# Patient Record
Sex: FEMALE | Race: WHITE | Employment: OTHER | URBAN - METROPOLITAN AREA
[De-identification: names, ages, dates, MRNs, and addresses within clinical notes are randomized per-mention and may not be internally consistent; named-entity substitution may affect disease eponyms.]

---

## 2017-04-19 ENCOUNTER — HOSPITAL ENCOUNTER (OUTPATIENT)
Dept: CT IMAGING | Age: 62
Discharge: HOME | End: 2017-04-19
Attending: UROLOGY
Payer: COMMERCIAL

## 2017-04-19 DIAGNOSIS — R91.1 LUNG NODULE: ICD-10-CM

## 2017-04-19 DIAGNOSIS — D41.02: ICD-10-CM

## 2017-04-19 PROCEDURE — 74011636320 HC RX REV CODE- 636/320: Performed by: UROLOGY

## 2017-04-19 PROCEDURE — 74160 CT ABDOMEN W/CONTRAST: CPT

## 2017-04-19 RX ADMIN — IOPAMIDOL 100 ML: 612 INJECTION, SOLUTION INTRAVENOUS at 10:04

## 2018-05-21 ENCOUNTER — HOSPITAL ENCOUNTER (OUTPATIENT)
Dept: CT IMAGING | Age: 63
Discharge: HOME | End: 2018-05-21
Attending: UROLOGY
Payer: COMMERCIAL

## 2018-05-21 DIAGNOSIS — D41.02 NEOPLASM OF UNCERTAIN BEHAVIOR OF LEFT KIDNEY: ICD-10-CM

## 2018-05-21 DIAGNOSIS — R91.1 SOLITARY LUNG NODULE: ICD-10-CM

## 2018-05-21 PROCEDURE — 74011636320 HC RX REV CODE- 636/320: Performed by: UROLOGY

## 2018-05-21 PROCEDURE — 74160 CT ABDOMEN W/CONTRAST: CPT

## 2018-05-21 RX ADMIN — IOPAMIDOL 100 ML: 612 INJECTION, SOLUTION INTRAVENOUS at 09:55

## 2019-08-16 ENCOUNTER — HOSPITAL ENCOUNTER (OUTPATIENT)
Dept: ULTRASOUND IMAGING | Age: 64
Discharge: HOME | End: 2019-08-16
Attending: UROLOGY
Payer: COMMERCIAL

## 2019-08-16 DIAGNOSIS — D41.02 NEOPLASM OF UNCERTAIN BEHAVIOR OF LEFT KIDNEY: ICD-10-CM

## 2019-08-16 PROCEDURE — 76770 US EXAM ABDO BACK WALL COMP: CPT

## 2020-08-03 ENCOUNTER — HOSPITAL ENCOUNTER (OUTPATIENT)
Dept: ULTRASOUND IMAGING | Age: 65
Discharge: HOME | End: 2020-08-03
Attending: UROLOGY
Payer: COMMERCIAL

## 2020-08-03 DIAGNOSIS — D41.02 NEOPLASM OF UNCERTAIN BEHAVIOR OF LEFT KIDNEY: ICD-10-CM

## 2020-08-03 PROCEDURE — 76770 US EXAM ABDO BACK WALL COMP: CPT

## 2021-07-20 ENCOUNTER — TRANSCRIBE ORDER (OUTPATIENT)
Dept: SCHEDULING | Age: 66
End: 2021-07-20

## 2021-07-20 DIAGNOSIS — N20.1 URETERAL STONE: Primary | ICD-10-CM

## 2021-08-11 ENCOUNTER — HOSPITAL ENCOUNTER (OUTPATIENT)
Dept: ULTRASOUND IMAGING | Age: 66
Discharge: HOME | End: 2021-08-11
Attending: UROLOGY
Payer: MEDICARE

## 2021-08-11 DIAGNOSIS — N20.1 URETERAL STONE: ICD-10-CM

## 2021-08-11 PROCEDURE — 76770 US EXAM ABDO BACK WALL COMP: CPT

## 2022-06-27 ENCOUNTER — HOSPITAL ENCOUNTER (INPATIENT)
Age: 67
LOS: 2 days | Discharge: HOME | DRG: 811 | End: 2022-06-29
Attending: EMERGENCY MEDICINE | Admitting: FAMILY MEDICINE
Payer: MEDICARE

## 2022-06-27 DIAGNOSIS — K92.1 MELENA: Primary | ICD-10-CM

## 2022-06-27 DIAGNOSIS — D64.9 ANEMIA, UNSPECIFIED TYPE: ICD-10-CM

## 2022-06-27 LAB
ALBUMIN SERPL-MCNC: 3.7 G/DL (ref 3.4–5)
ALBUMIN/GLOB SERPL: 1 {RATIO} (ref 0.8–1.7)
ALP SERPL-CCNC: 122 U/L (ref 45–117)
ALT SERPL-CCNC: 26 U/L (ref 13–56)
ANION GAP SERPL CALC-SCNC: 2 MMOL/L (ref 3–18)
APTT PPP: 27.2 SEC (ref 23–36.4)
AST SERPL-CCNC: 20 U/L (ref 10–38)
BASOPHILS # BLD: 0.1 K/UL (ref 0–0.1)
BASOPHILS NFR BLD: 1 % (ref 0–2)
BILIRUB SERPL-MCNC: 0.2 MG/DL (ref 0.2–1)
BUN SERPL-MCNC: 18 MG/DL (ref 7–18)
BUN/CREAT SERPL: 16 (ref 12–20)
CALCIUM SERPL-MCNC: 8.7 MG/DL (ref 8.5–10.1)
CHLORIDE SERPL-SCNC: 106 MMOL/L (ref 100–111)
CO2 SERPL-SCNC: 30 MMOL/L (ref 21–32)
COVID-19 RAPID TEST, COVR: NOT DETECTED
CREAT SERPL-MCNC: 1.14 MG/DL (ref 0.6–1.3)
DIFFERENTIAL METHOD BLD: ABNORMAL
EOSINOPHIL # BLD: 0.1 K/UL (ref 0–0.4)
EOSINOPHIL NFR BLD: 1 % (ref 0–5)
ERYTHROCYTE [DISTWIDTH] IN BLOOD BY AUTOMATED COUNT: 18.1 % (ref 11.6–14.5)
GLOBULIN SER CALC-MCNC: 3.6 G/DL (ref 2–4)
GLUCOSE SERPL-MCNC: 104 MG/DL (ref 74–99)
HCT VFR BLD AUTO: 20.3 % (ref 35–45)
HEMOCCULT STL QL: POSITIVE
HGB BLD-MCNC: 5.5 G/DL (ref 12–16)
HISTORY CHECKED?,CKHIST: NORMAL
IMM GRANULOCYTES # BLD AUTO: 0.1 K/UL (ref 0–0.04)
IMM GRANULOCYTES NFR BLD AUTO: 1 % (ref 0–0.5)
INR PPP: 0.9 (ref 0.8–1.2)
LYMPHOCYTES # BLD: 3 K/UL (ref 0.9–3.6)
LYMPHOCYTES NFR BLD: 30 % (ref 21–52)
MCH RBC QN AUTO: 18.8 PG (ref 24–34)
MCHC RBC AUTO-ENTMCNC: 27.1 G/DL (ref 31–37)
MCV RBC AUTO: 69.3 FL (ref 78–100)
MONOCYTES # BLD: 0.5 K/UL (ref 0.05–1.2)
MONOCYTES NFR BLD: 5 % (ref 3–10)
NEUTS SEG # BLD: 6.2 K/UL (ref 1.8–8)
NEUTS SEG NFR BLD: 62 % (ref 40–73)
NRBC # BLD: 0 K/UL (ref 0–0.01)
NRBC BLD-RTO: 0 PER 100 WBC
PLATELET # BLD AUTO: 422 K/UL (ref 135–420)
PLATELET COMMENTS,PCOM: ABNORMAL
PMV BLD AUTO: 8.6 FL (ref 9.2–11.8)
POTASSIUM SERPL-SCNC: 3.9 MMOL/L (ref 3.5–5.5)
PROT SERPL-MCNC: 7.3 G/DL (ref 6.4–8.2)
PROTHROMBIN TIME: 13 SEC (ref 11.5–15.2)
RBC # BLD AUTO: 2.93 M/UL (ref 4.2–5.3)
RBC MORPH BLD: ABNORMAL
SODIUM SERPL-SCNC: 138 MMOL/L (ref 136–145)
SOURCE, COVRS: NORMAL
TROPONIN-HIGH SENSITIVITY: 9 NG/L (ref 0–54)
WBC # BLD AUTO: 10 K/UL (ref 4.6–13.2)

## 2022-06-27 PROCEDURE — 85025 COMPLETE CBC W/AUTO DIFF WBC: CPT

## 2022-06-27 PROCEDURE — 82272 OCCULT BLD FECES 1-3 TESTS: CPT

## 2022-06-27 PROCEDURE — 85730 THROMBOPLASTIN TIME PARTIAL: CPT

## 2022-06-27 PROCEDURE — 85610 PROTHROMBIN TIME: CPT

## 2022-06-27 PROCEDURE — 84484 ASSAY OF TROPONIN QUANT: CPT

## 2022-06-27 PROCEDURE — C9113 INJ PANTOPRAZOLE SODIUM, VIA: HCPCS | Performed by: EMERGENCY MEDICINE

## 2022-06-27 PROCEDURE — 86900 BLOOD TYPING SEROLOGIC ABO: CPT

## 2022-06-27 PROCEDURE — P9016 RBC LEUKOCYTES REDUCED: HCPCS

## 2022-06-27 PROCEDURE — 96374 THER/PROPH/DIAG INJ IV PUSH: CPT

## 2022-06-27 PROCEDURE — 80053 COMPREHEN METABOLIC PANEL: CPT

## 2022-06-27 PROCEDURE — 65270000029 HC RM PRIVATE

## 2022-06-27 PROCEDURE — 74011250636 HC RX REV CODE- 250/636: Performed by: EMERGENCY MEDICINE

## 2022-06-27 PROCEDURE — 74011000250 HC RX REV CODE- 250: Performed by: FAMILY MEDICINE

## 2022-06-27 PROCEDURE — 93005 ELECTROCARDIOGRAM TRACING: CPT

## 2022-06-27 PROCEDURE — 30233N1 TRANSFUSION OF NONAUTOLOGOUS RED BLOOD CELLS INTO PERIPHERAL VEIN, PERCUTANEOUS APPROACH: ICD-10-PCS | Performed by: FAMILY MEDICINE

## 2022-06-27 PROCEDURE — 87635 SARS-COV-2 COVID-19 AMP PRB: CPT

## 2022-06-27 PROCEDURE — 86920 COMPATIBILITY TEST SPIN: CPT

## 2022-06-27 PROCEDURE — 36430 TRANSFUSION BLD/BLD COMPNT: CPT

## 2022-06-27 PROCEDURE — 99285 EMERGENCY DEPT VISIT HI MDM: CPT

## 2022-06-27 RX ORDER — SODIUM CHLORIDE 0.9 % (FLUSH) 0.9 %
5-40 SYRINGE (ML) INJECTION EVERY 8 HOURS
Status: DISCONTINUED | OUTPATIENT
Start: 2022-06-27 | End: 2022-06-29 | Stop reason: HOSPADM

## 2022-06-27 RX ORDER — ACETAMINOPHEN 650 MG/1
650 SUPPOSITORY RECTAL
Status: DISCONTINUED | OUTPATIENT
Start: 2022-06-27 | End: 2022-06-29 | Stop reason: HOSPADM

## 2022-06-27 RX ORDER — SODIUM CHLORIDE 9 MG/ML
250 INJECTION, SOLUTION INTRAVENOUS AS NEEDED
Status: DISCONTINUED | OUTPATIENT
Start: 2022-06-27 | End: 2022-06-29 | Stop reason: HOSPADM

## 2022-06-27 RX ORDER — POLYETHYLENE GLYCOL 3350 17 G/17G
17 POWDER, FOR SOLUTION ORAL DAILY PRN
Status: DISCONTINUED | OUTPATIENT
Start: 2022-06-27 | End: 2022-06-29 | Stop reason: HOSPADM

## 2022-06-27 RX ORDER — PANTOPRAZOLE SODIUM 40 MG/10ML
40 INJECTION, POWDER, LYOPHILIZED, FOR SOLUTION INTRAVENOUS EVERY 12 HOURS
Status: DISCONTINUED | OUTPATIENT
Start: 2022-06-27 | End: 2022-06-29 | Stop reason: HOSPADM

## 2022-06-27 RX ORDER — PANTOPRAZOLE SODIUM 40 MG/10ML
40 INJECTION, POWDER, LYOPHILIZED, FOR SOLUTION INTRAVENOUS
Status: COMPLETED | OUTPATIENT
Start: 2022-06-27 | End: 2022-06-27

## 2022-06-27 RX ORDER — ONDANSETRON 4 MG/1
4 TABLET, ORALLY DISINTEGRATING ORAL
Status: DISCONTINUED | OUTPATIENT
Start: 2022-06-27 | End: 2022-06-29 | Stop reason: HOSPADM

## 2022-06-27 RX ORDER — SODIUM CHLORIDE 0.9 % (FLUSH) 0.9 %
5-40 SYRINGE (ML) INJECTION AS NEEDED
Status: DISCONTINUED | OUTPATIENT
Start: 2022-06-27 | End: 2022-06-29 | Stop reason: HOSPADM

## 2022-06-27 RX ORDER — ACETAMINOPHEN 325 MG/1
650 TABLET ORAL
Status: DISCONTINUED | OUTPATIENT
Start: 2022-06-27 | End: 2022-06-29 | Stop reason: HOSPADM

## 2022-06-27 RX ORDER — ONDANSETRON 2 MG/ML
4 INJECTION INTRAMUSCULAR; INTRAVENOUS
Status: DISCONTINUED | OUTPATIENT
Start: 2022-06-27 | End: 2022-06-29 | Stop reason: HOSPADM

## 2022-06-27 RX ORDER — SERTRALINE HYDROCHLORIDE 100 MG/1
100 TABLET, FILM COATED ORAL DAILY
COMMUNITY

## 2022-06-27 RX ADMIN — SODIUM CHLORIDE, PRESERVATIVE FREE 10 ML: 5 INJECTION INTRAVENOUS at 22:45

## 2022-06-27 RX ADMIN — PANTOPRAZOLE SODIUM 40 MG: 40 INJECTION, POWDER, FOR SOLUTION INTRAVENOUS at 19:34

## 2022-06-27 NOTE — CONSENT
Informed Consent for Blood Component Transfusion Note    I have discussed with the patient and spouse the rationale for blood component transfusion; its benefits in treating or preventing fatigue, organ damage, or death; and its risk which includes mild transfusion reactions, rare risk of blood borne infection, or more serious but rare reactions. I have discussed the alternatives to transfusion, including the risk and consequences of not receiving transfusion. The patient and spouse had an opportunity to ask questions and had agreed to proceed with transfusion of blood components.     Electronically signed by Kylee Sutherland MD on 6/27/22 at 7:24 PM

## 2022-06-27 NOTE — ED TRIAGE NOTES
Pt arrives to ed reporting getting a call from her dr instructing her to come into the ED for abnormal labs. Labs hemoglobin 5.4 hematocrit 17. 5. pt reports dizziness for the past 6 weeks and also noticed darker stools today.

## 2022-06-27 NOTE — ED PROVIDER NOTES
EMERGENCY DEPARTMENT HISTORY AND PHYSICAL EXAM    Date: 6/27/2022  Patient Name: Teo Friedman    History of Presenting Illness     Chief Complaint   Patient presents with    Abnormal Lab Results         History Provided By: Patient and Patient's     7:02 PM  Teo Friedman is a 69 y.o. female with no significant PMHX who presents to the emergency department C/O dizziness and shortness of breath. Patient reports that she has no symptoms over the past few weeks. She reports is worse with exertion. She states she went to her primary care doctor today and had blood work and was called and told her hemoglobin was critically low and she is to come to the emergency department for transfusion. She states that she has noted that her stool has been black in color for the last few days. Denies any bright red blood in her stool, vaginal bleeding, other blood loss, abdominal pain, fever, cough, chest pain, other complaints. She reports she has had a colonoscopy but not recently and there were no abnormalities at that time. No other relieving or exacerbating factors identified.      PCP: Gabriela Koenig MD    Current Facility-Administered Medications   Medication Dose Route Frequency Provider Last Rate Last Admin    0.9% sodium chloride infusion 250 mL  250 mL IntraVENous PRN Mil Reno MD        sodium chloride (NS) flush 5-40 mL  5-40 mL IntraVENous Q8H Elyssa Pascual MD        sodium chloride (NS) flush 5-40 mL  5-40 mL IntraVENous PRN Elyssa Pascual MD        acetaminophen (TYLENOL) tablet 650 mg  650 mg Oral Q6H PRN Elyssa Pascual MD        Or    acetaminophen (TYLENOL) suppository 650 mg  650 mg Rectal Q6H PRN Elyssa Pascual MD        polyethylene glycol (MIRALAX) packet 17 g  17 g Oral DAILY PRN Elyssa Pascual MD        ondansetron (ZOFRAN ODT) tablet 4 mg  4 mg Oral Q8H PRN Elyssa Pascual MD        Or    ondansetron [facility] injection 4 mg  4 mg IntraVENous Q6H PRN Burgess Rich MD        pantoprazole (PROTONIX) injection 40 mg  40 mg IntraVENous Q12H Burgess Rich MD         Current Outpatient Medications   Medication Sig Dispense Refill    sertraline (Zoloft) 100 mg tablet Take 100 mg by mouth daily.  escitalopram oxalate (LEXAPRO) 10 mg tablet Take 10 mg by mouth daily. (Patient not taking: Reported on 6/27/2022)         Past History       Past Medical History:  No past medical history on file. Past Surgical History:  No past surgical history on file. Family History:  No family history on file. Social History:  Social History     Tobacco Use    Smoking status: Never Smoker    Smokeless tobacco: Not on file   Substance Use Topics    Alcohol use: Not on file    Drug use: Not on file       Allergies: Allergies   Allergen Reactions    Sulfa (Sulfonamide Antibiotics) Rash         Review of Systems   Review of Systems   Constitutional: Negative for fever. Respiratory: Positive for shortness of breath. Cardiovascular: Negative for chest pain. Gastrointestinal: Negative for abdominal pain. Neurological: Positive for dizziness and light-headedness. All other systems reviewed and are negative.         Physical Exam     Vitals:    06/27/22 1853 06/27/22 1854   BP:  (!) 141/65   Pulse:  90   Resp:  18   Temp:  99.1 °F (37.3 °C)   SpO2:  98%   Weight: 74.4 kg (164 lb)    Height: 5' 3\" (1.6 m)      Physical Exam    Nursing notes and vital signs reviewed    Constitutional: Non toxic appearing, moderate distress  Head: Normocephalic, Atraumatic  Eyes: EOMI  Neck: Supple  Cardiovascular: Regular rate and rhythm, no murmurs, rubs, or gallops  Chest: Normal work of breathing and chest excursion bilaterally  Lungs: Clear to ausculation bilaterally  Abdomen: Soft, non tender, non distended  Rectal: See below  Back: No evidence of trauma or deformity  Extremities: No evidence of trauma or deformity, no LE edema  Skin: Warm and dry, normal cap refill  Neuro: Alert and appropriate  Psychiatric: Normal mood and affect      Diagnostic Study Results     Labs -     Recent Results (from the past 12 hour(s))   CBC WITH AUTOMATED DIFF    Collection Time: 06/27/22  7:10 PM   Result Value Ref Range    WBC 10.0 4.6 - 13.2 K/uL    RBC 2.93 (L) 4.20 - 5.30 M/uL    HGB 5.5 (LL) 12.0 - 16.0 g/dL    HCT 20.3 (L) 35.0 - 45.0 %    MCV 69.3 (L) 78.0 - 100.0 FL    MCH 18.8 (L) 24.0 - 34.0 PG    MCHC 27.1 (L) 31.0 - 37.0 g/dL    RDW 18.1 (H) 11.6 - 14.5 %    PLATELET 117 (H) 866 - 420 K/uL    MPV 8.6 (L) 9.2 - 11.8 FL    NRBC 0.0 0  WBC    ABSOLUTE NRBC 0.00 0.00 - 0.01 K/uL    NEUTROPHILS 62 40 - 73 %    LYMPHOCYTES 30 21 - 52 %    MONOCYTES 5 3 - 10 %    EOSINOPHILS 1 0 - 5 %    BASOPHILS 1 0 - 2 %    IMMATURE GRANULOCYTES 1 (H) 0.0 - 0.5 %    ABS. NEUTROPHILS 6.2 1.8 - 8.0 K/UL    ABS. LYMPHOCYTES 3.0 0.9 - 3.6 K/UL    ABS. MONOCYTES 0.5 0.05 - 1.2 K/UL    ABS. EOSINOPHILS 0.1 0.0 - 0.4 K/UL    ABS. BASOPHILS 0.1 0.0 - 0.1 K/UL    ABS. IMM.  GRANS. 0.1 (H) 0.00 - 0.04 K/UL    DF AUTOMATED      PLATELET COMMENTS Increased Platelets      RBC COMMENTS MICROCYTOSIS  1+        RBC COMMENTS HYPOCHROMIA  2+        RBC COMMENTS ANISOCYTOSIS  1+       PROTHROMBIN TIME + INR    Collection Time: 06/27/22  7:10 PM   Result Value Ref Range    Prothrombin time 13.0 11.5 - 15.2 sec    INR 0.9 0.8 - 1.2     PTT    Collection Time: 06/27/22  7:10 PM   Result Value Ref Range    aPTT 27.2 23.0 - 87.2 SEC   METABOLIC PANEL, COMPREHENSIVE    Collection Time: 06/27/22  7:10 PM   Result Value Ref Range    Sodium 138 136 - 145 mmol/L    Potassium 3.9 3.5 - 5.5 mmol/L    Chloride 106 100 - 111 mmol/L    CO2 30 21 - 32 mmol/L    Anion gap 2 (L) 3.0 - 18 mmol/L    Glucose 104 (H) 74 - 99 mg/dL    BUN 18 7.0 - 18 MG/DL    Creatinine 1.14 0.6 - 1.3 MG/DL    BUN/Creatinine ratio 16 12 - 20      GFR est AA 57 (L) >60 ml/min/1.73m2    GFR est non-AA 47 (L) >60 ml/min/1.73m2    Calcium 8.7 8.5 - 10.1 MG/DL    Bilirubin, total 0.2 0.2 - 1.0 MG/DL    ALT (SGPT) 26 13 - 56 U/L    AST (SGOT) 20 10 - 38 U/L    Alk.  phosphatase 122 (H) 45 - 117 U/L    Protein, total 7.3 6.4 - 8.2 g/dL    Albumin 3.7 3.4 - 5.0 g/dL    Globulin 3.6 2.0 - 4.0 g/dL    A-G Ratio 1.0 0.8 - 1.7     OCCULT BLOOD, STOOL    Collection Time: 06/27/22  7:10 PM   Result Value Ref Range    Occult blood, stool Positive (A) NEG     TROPONIN-HIGH SENSITIVITY    Collection Time: 06/27/22  7:10 PM   Result Value Ref Range    Troponin-High Sensitivity 9 0 - 54 ng/L   EKG, 12 LEAD, INITIAL    Collection Time: 06/27/22  7:37 PM   Result Value Ref Range    Ventricular Rate 85 BPM    Atrial Rate 85 BPM    P-R Interval 148 ms    QRS Duration 86 ms    Q-T Interval 380 ms    QTC Calculation (Bezet) 452 ms    Calculated P Axis 44 degrees    Calculated R Axis 10 degrees    Calculated T Axis 30 degrees    Diagnosis       Normal sinus rhythm  Normal ECG  No previous ECGs available         Radiologic Studies -   No orders to display     CT Results  (Last 48 hours)    None        CXR Results  (Last 48 hours)    None          Medications given in the ED-  Medications   0.9% sodium chloride infusion 250 mL (has no administration in time range)   sodium chloride (NS) flush 5-40 mL (has no administration in time range)   sodium chloride (NS) flush 5-40 mL (has no administration in time range)   acetaminophen (TYLENOL) tablet 650 mg (has no administration in time range)     Or   acetaminophen (TYLENOL) suppository 650 mg (has no administration in time range)   polyethylene glycol (MIRALAX) packet 17 g (has no administration in time range)   ondansetron (ZOFRAN ODT) tablet 4 mg (has no administration in time range)     Or   ondansetron (ZOFRAN) injection 4 mg (has no administration in time range)   pantoprazole (PROTONIX) injection 40 mg (has no administration in time range)   pantoprazole (PROTONIX) injection 40 mg (40 mg IntraVENous Given 6/27/22 1934)         Medical Decision Making   I am the first provider for this patient. I reviewed the vital signs, available nursing notes, past medical history, past surgical history, family history and social history. Vital Signs-Reviewed the patient's vital signs. Pulse Oximetry Analysis - 98% on room air, not hypoxic     Cardiac Monitor:  Rate: 98 bpm  Rhythm: Normal sinus    EKG interpretation: (Preliminary)  EKG read by Dr. Mohit Dutton at 7:42 PM  Normal sinus rhythm at a rate of 85 bpm, MO interval 140 ms, QRS duration 86 ms, no prior available for comparison    Records Reviewed: Nursing Notes    Provider Notes (Medical Decision Making): Cheli Bowens is a 69 y.o. female presenting for dizziness and shortness of breath on exertion after visiting her primary care doctor today and finding a critically low hemoglobin. Patient is hemodynamically stable but does have melena on exam.  Critically low hemoglobin was confirmed and type and screen sent and blood transfusion initiated. PPI initiated. Discussed with both gastroenterology and hospitalist for further in-hospital evaluation management. Patient and her  understand and agree with this plan. Procedures:  Procedures    ED Course:   PROCEDURE NOTE - RECTAL EXAM:   7:10 PM  Chaperoned by: Mary Carmen Santana RN  Rectal exam performed. Black stool was collected. Stool was sent to lab for Hemoccult testing. The procedure took 1-15 minutes, and pt tolerated well. CONSULT NOTE:   7:51 PM  Dr. Mohit Dutton spoke with Dr. Mai Yu   Specialty: GI  Discussed pt's hx, disposition, and available diagnostic and imaging results over the telephone. Reviewed care plans. Will consult on the patient. CONSULT NOTE:   8:08 PM  Dr. Mohit Dutton spoke with Dr. Bhumika Cruz   Specialty: Hospitalist  Discussed pt's hx, disposition, and available diagnostic and imaging results over the telephone. Reviewed care plans.  Accepts to remote telemetry. 8:10 PM  Updated patient on all results and plan. All questions answered. Diagnosis and Disposition     Critical Care Time: 8:10 PM  I have spent 36 minutes of critical care time involved in lab review, consultations with specialist, family decision-making, and documentation. During this entire length of time I was immediately available to the patient. Critical Care: The reason for providing this level of medical care for this critically ill patient was due a critical illness that impaired one or more vital organ systems such that there was a high probability of imminent or life threatening deterioration in the patients condition. This care involved high complexity decision making to assess, manipulate, and support vital system functions, to treat this degreee vital organ system failure and to prevent further life threatening deterioration of the patients condition. Core Measures:  For Hospitalized Patients:    1. Hospitalization Decision Time:  The decision to hospitalize the patient was made by Dr. Jodi Dominguez at 7:10 PM on 6/27/2022    2. Aspirin: Aspirin was not given because the patient did not present with a stroke at the time of their Emergency Department evaluation    7:52 PM  Patient is being admitted to the hospital by Dr. Rosa Bryson. The results of their tests and reasons for their admission have been discussed with them and/or available family. They convey agreement and understanding for the need to be admitted and for their admission diagnosis. CONDITIONS ON ADMISSION:  Sepsis is not present at the time of admission. Deep Vein Thrombosis is not present at the time of admission. Thrombosis is not present at the time of admission. Urinary Tract Infection is not present at the time of admission. Pneumonia is not present at the time of admission. MRSA is not present at the time of admission. Wound infection is not present at the time of admission.  Pressure Ulcer is not present at the time of admission. CLINICAL IMPRESSION:    1. Melena    2. Anemia, unspecified type      _______________________________      Please note that this dictation was completed with PureWRX, the computer voice recognition software. Quite often unanticipated grammatical, syntax, homophones, and other interpretive errors are inadvertently transcribed by the computer software. Please disregard these errors. Please excuse any errors that have escaped final proofreading.

## 2022-06-28 PROBLEM — K92.1 MELENA: Status: ACTIVE | Noted: 2022-06-28

## 2022-06-28 PROBLEM — K44.9 HIATAL HERNIA: Status: ACTIVE | Noted: 2022-06-28

## 2022-06-28 LAB
ALBUMIN SERPL-MCNC: 3.2 G/DL (ref 3.4–5)
ALBUMIN/GLOB SERPL: 1 {RATIO} (ref 0.8–1.7)
ALP SERPL-CCNC: 104 U/L (ref 45–117)
ALT SERPL-CCNC: 23 U/L (ref 13–56)
ANION GAP SERPL CALC-SCNC: 5 MMOL/L (ref 3–18)
AST SERPL-CCNC: 16 U/L (ref 10–38)
BILIRUB SERPL-MCNC: 0.4 MG/DL (ref 0.2–1)
BUN SERPL-MCNC: 16 MG/DL (ref 7–18)
BUN/CREAT SERPL: 19 (ref 12–20)
CALCIUM SERPL-MCNC: 8 MG/DL (ref 8.5–10.1)
CHLORIDE SERPL-SCNC: 109 MMOL/L (ref 100–111)
CO2 SERPL-SCNC: 25 MMOL/L (ref 21–32)
CREAT SERPL-MCNC: 0.84 MG/DL (ref 0.6–1.3)
ERYTHROCYTE [DISTWIDTH] IN BLOOD BY AUTOMATED COUNT: 19.5 % (ref 11.6–14.5)
GLOBULIN SER CALC-MCNC: 3.2 G/DL (ref 2–4)
GLUCOSE SERPL-MCNC: 91 MG/DL (ref 74–99)
HCT VFR BLD AUTO: 20.4 % (ref 35–45)
HCT VFR BLD AUTO: 26.5 % (ref 35–45)
HCT VFR BLD AUTO: 28.3 % (ref 35–45)
HGB BLD-MCNC: 6 G/DL (ref 12–16)
HGB BLD-MCNC: 8 G/DL (ref 12–16)
HGB BLD-MCNC: 8.4 G/DL (ref 12–16)
HISTORY CHECKED?,CKHIST: NORMAL
MCH RBC QN AUTO: 20.6 PG (ref 24–34)
MCHC RBC AUTO-ENTMCNC: 29.4 G/DL (ref 31–37)
MCV RBC AUTO: 70.1 FL (ref 78–100)
NRBC # BLD: 0 K/UL (ref 0–0.01)
NRBC BLD-RTO: 0 PER 100 WBC
PLATELET # BLD AUTO: 354 K/UL (ref 135–420)
PMV BLD AUTO: 8.8 FL (ref 9.2–11.8)
POTASSIUM SERPL-SCNC: 3.9 MMOL/L (ref 3.5–5.5)
PROT SERPL-MCNC: 6.4 G/DL (ref 6.4–8.2)
RBC # BLD AUTO: 2.91 M/UL (ref 4.2–5.3)
SODIUM SERPL-SCNC: 139 MMOL/L (ref 136–145)
WBC # BLD AUTO: 8.1 K/UL (ref 4.6–13.2)

## 2022-06-28 PROCEDURE — 88342 IMHCHEM/IMCYTCHM 1ST ANTB: CPT

## 2022-06-28 PROCEDURE — 86677 HELICOBACTER PYLORI ANTIBODY: CPT

## 2022-06-28 PROCEDURE — 74011250636 HC RX REV CODE- 250/636: Performed by: FAMILY MEDICINE

## 2022-06-28 PROCEDURE — 74011250636 HC RX REV CODE- 250/636: Performed by: INTERNAL MEDICINE

## 2022-06-28 PROCEDURE — 76040000007: Performed by: INTERNAL MEDICINE

## 2022-06-28 PROCEDURE — 0DB78ZX EXCISION OF STOMACH, PYLORUS, VIA NATURAL OR ARTIFICIAL OPENING ENDOSCOPIC, DIAGNOSTIC: ICD-10-PCS | Performed by: INTERNAL MEDICINE

## 2022-06-28 PROCEDURE — 77030039961 HC KT CUST COLON BSC -D: Performed by: INTERNAL MEDICINE

## 2022-06-28 PROCEDURE — 80053 COMPREHEN METABOLIC PANEL: CPT

## 2022-06-28 PROCEDURE — 2709999900 HC NON-CHARGEABLE SUPPLY: Performed by: INTERNAL MEDICINE

## 2022-06-28 PROCEDURE — 85018 HEMOGLOBIN: CPT

## 2022-06-28 PROCEDURE — 88305 TISSUE EXAM BY PATHOLOGIST: CPT

## 2022-06-28 PROCEDURE — 65270000029 HC RM PRIVATE

## 2022-06-28 PROCEDURE — C9113 INJ PANTOPRAZOLE SODIUM, VIA: HCPCS | Performed by: FAMILY MEDICINE

## 2022-06-28 PROCEDURE — 77030040361 HC SLV COMPR DVT MDII -B: Performed by: INTERNAL MEDICINE

## 2022-06-28 PROCEDURE — 85027 COMPLETE CBC AUTOMATED: CPT

## 2022-06-28 PROCEDURE — P9016 RBC LEUKOCYTES REDUCED: HCPCS

## 2022-06-28 PROCEDURE — G0500 MOD SEDAT ENDO SERVICE >5YRS: HCPCS | Performed by: INTERNAL MEDICINE

## 2022-06-28 PROCEDURE — 36430 TRANSFUSION BLD/BLD COMPNT: CPT

## 2022-06-28 PROCEDURE — 36415 COLL VENOUS BLD VENIPUNCTURE: CPT

## 2022-06-28 PROCEDURE — 74011250637 HC RX REV CODE- 250/637: Performed by: FAMILY MEDICINE

## 2022-06-28 PROCEDURE — 77030021593 HC FCPS BIOP ENDOSC BSC -A: Performed by: INTERNAL MEDICINE

## 2022-06-28 PROCEDURE — 74011000250 HC RX REV CODE- 250: Performed by: FAMILY MEDICINE

## 2022-06-28 RX ORDER — SODIUM CHLORIDE 0.9 % (FLUSH) 0.9 %
5-40 SYRINGE (ML) INJECTION AS NEEDED
Status: CANCELLED | OUTPATIENT
Start: 2022-06-28

## 2022-06-28 RX ORDER — DEXTROMETHORPHAN/PSEUDOEPHED 2.5-7.5/.8
1.2 DROPS ORAL
Status: CANCELLED | OUTPATIENT
Start: 2022-06-28

## 2022-06-28 RX ORDER — SODIUM CHLORIDE 0.9 % (FLUSH) 0.9 %
5-40 SYRINGE (ML) INJECTION EVERY 8 HOURS
Status: CANCELLED | OUTPATIENT
Start: 2022-06-28

## 2022-06-28 RX ORDER — FENTANYL CITRATE 50 UG/ML
100 INJECTION, SOLUTION INTRAMUSCULAR; INTRAVENOUS
Status: DISCONTINUED | OUTPATIENT
Start: 2022-06-28 | End: 2022-06-28 | Stop reason: HOSPADM

## 2022-06-28 RX ORDER — SODIUM CHLORIDE 9 MG/ML
1000 INJECTION, SOLUTION INTRAVENOUS CONTINUOUS
Status: DISPENSED | OUTPATIENT
Start: 2022-06-28 | End: 2022-06-28

## 2022-06-28 RX ORDER — SERTRALINE HYDROCHLORIDE 50 MG/1
100 TABLET, FILM COATED ORAL DAILY
Status: DISCONTINUED | OUTPATIENT
Start: 2022-06-28 | End: 2022-06-29 | Stop reason: HOSPADM

## 2022-06-28 RX ORDER — FLUMAZENIL 0.1 MG/ML
0.2 INJECTION INTRAVENOUS
Status: DISCONTINUED | OUTPATIENT
Start: 2022-06-28 | End: 2022-06-28 | Stop reason: HOSPADM

## 2022-06-28 RX ORDER — MIDAZOLAM HYDROCHLORIDE 1 MG/ML
.25-5 INJECTION, SOLUTION INTRAMUSCULAR; INTRAVENOUS
Status: DISCONTINUED | OUTPATIENT
Start: 2022-06-28 | End: 2022-06-28 | Stop reason: HOSPADM

## 2022-06-28 RX ORDER — ATROPINE SULFATE 0.1 MG/ML
0.5 INJECTION INTRAVENOUS
Status: CANCELLED | OUTPATIENT
Start: 2022-06-28 | End: 2022-06-29

## 2022-06-28 RX ORDER — DIPHENHYDRAMINE HYDROCHLORIDE 50 MG/ML
50 INJECTION, SOLUTION INTRAMUSCULAR; INTRAVENOUS ONCE
Status: CANCELLED | OUTPATIENT
Start: 2022-06-28 | End: 2022-06-28

## 2022-06-28 RX ORDER — EPINEPHRINE 0.1 MG/ML
1 INJECTION INTRACARDIAC; INTRAVENOUS
Status: CANCELLED | OUTPATIENT
Start: 2022-06-28 | End: 2022-06-29

## 2022-06-28 RX ORDER — SODIUM CHLORIDE 9 MG/ML
250 INJECTION, SOLUTION INTRAVENOUS AS NEEDED
Status: DISCONTINUED | OUTPATIENT
Start: 2022-06-28 | End: 2022-06-29 | Stop reason: HOSPADM

## 2022-06-28 RX ORDER — SODIUM CHLORIDE 9 MG/ML
1000 INJECTION, SOLUTION INTRAVENOUS CONTINUOUS
Status: CANCELLED | OUTPATIENT
Start: 2022-06-28

## 2022-06-28 RX ORDER — NALOXONE HYDROCHLORIDE 0.4 MG/ML
0.4 INJECTION, SOLUTION INTRAMUSCULAR; INTRAVENOUS; SUBCUTANEOUS
Status: DISCONTINUED | OUTPATIENT
Start: 2022-06-28 | End: 2022-06-28 | Stop reason: HOSPADM

## 2022-06-28 RX ADMIN — PANTOPRAZOLE SODIUM 40 MG: 40 INJECTION, POWDER, FOR SOLUTION INTRAVENOUS at 09:52

## 2022-06-28 RX ADMIN — SODIUM CHLORIDE, PRESERVATIVE FREE 10 ML: 5 INJECTION INTRAVENOUS at 21:33

## 2022-06-28 RX ADMIN — SERTRALINE 100 MG: 50 TABLET, FILM COATED ORAL at 09:52

## 2022-06-28 RX ADMIN — SODIUM CHLORIDE, PRESERVATIVE FREE 10 ML: 5 INJECTION INTRAVENOUS at 14:23

## 2022-06-28 RX ADMIN — SODIUM CHLORIDE, PRESERVATIVE FREE 10 ML: 5 INJECTION INTRAVENOUS at 05:55

## 2022-06-28 RX ADMIN — SODIUM CHLORIDE 1000 ML: 900 INJECTION, SOLUTION INTRAVENOUS at 14:27

## 2022-06-28 RX ADMIN — PANTOPRAZOLE SODIUM 40 MG: 40 INJECTION, POWDER, FOR SOLUTION INTRAVENOUS at 21:43

## 2022-06-28 NOTE — PROGRESS NOTES
Problem: Falls - Risk of  Goal: *Absence of Falls  Description: Document Adams Sandhu Fall Risk and appropriate interventions in the flowsheet.   6/28/2022 1244 by Ni Montiel RN  Outcome: Progressing Towards Goal  Note: Fall Risk Interventions:            Medication Interventions: Teach patient to arise slowly                6/28/2022 1244 by Ni Montiel RN  Outcome: Progressing Towards Goal  Note: Fall Risk Interventions:            Medication Interventions: Teach patient to arise slowly                   Problem: General Medical Care Plan  Goal: *Vital signs within specified parameters  Outcome: Progressing Towards Goal  Goal: *Labs within defined limits  Outcome: Progressing Towards Goal  Goal: *Absence of infection signs and symptoms  Outcome: Progressing Towards Goal  Goal: *Optimal pain control at patient's stated goal  Outcome: Progressing Towards Goal  Goal: *Skin integrity maintained  Outcome: Progressing Towards Goal  Goal: *Fluid volume balance  Outcome: Progressing Towards Goal  Goal: *Optimize nutritional status  Outcome: Progressing Towards Goal  Goal: *Anxiety reduced or absent  Outcome: Progressing Towards Goal
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
## 2022-06-28 NOTE — PROGRESS NOTES
Spoke with lab, stated they are drawing pt post-transfusion H&H now. Will await results. No further concerns at this time.

## 2022-06-28 NOTE — PERIOP NOTES
Accompanied to inpatient unit and room, call bell in reach, son was in room upon arrival, advised of procedure that was completed and plan is for discharge after md conditions are met, probably tomorrow, patient spoke with son, has [address] [facility] W/DI RESPIRATIONS NONLABORED, ABDOMEN SOFT TO PALPATION, [id number] [facility].  VERBAL REPORT GIVEN TO PRIMARY NURSE [address] [facility] [address] RN, SHE WILL ASSUME CARE

## 2022-06-28 NOTE — PROGRESS NOTES
Pt was laying in bed during shift assessment. She is alert and oriented and compliant with all care provided. Pt post transfusion H&H was good, elevated hgb to 8.4; will continue to monitor. No issues noted at this time and no complaints from pt of pain or discomfort. She continues on a clear liquid diet for testing today; she is on room air. No new concerns noted at this time.
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
## 2022-06-28 NOTE — PROGRESS NOTES
Hospitalist Progress Note    Patient: Ed Rehman MRN: [id number]  CSN: [id number]    YOB: 1955  Age: 69 y.o. Sex: female    DOA: 6/27/2022 LOS:  LOS: 1 day          Chief Complaint:    symptomatic anemia and melena. Assessment/Plan     70-year-old healthy female is admitted with symptomatic anemia and melena.     Symptomatic anemia with melena and hiatal hernia -  Hgb now 8.4, stable for now  Transfuse for hemoglobin less than 7  Clear liquid diet  GI consult for EGD  Follow H. pylori testing  Protonix 40 mg IV twice daily     Full code     Prophylaxis: SCDs, protonix IV, no anticoagulation due to bleeding     Estimated length of stay : 2 nights    Disposition :  Patient Active Problem List   Diagnosis Code    Symptomatic anemia D64.9    Melena K92.1    Hiatal hernia K44.9       Subjective:    Feels better \"less loopy\"  No sob, db, cp or abd pain    Review of systems:    Constitutional: denies fevers, chills, myalgias  Respiratory: denies SOB, cough  Cardiovascular: denies chest pain, palpitations  Gastrointestinal: denies nausea, vomiting, diarrhea      Vital signs/Intake and Output:  Visit Vitals  /66   Pulse 74   Temp 98.8 °F (37.1 °C)   Resp 18   Ht 5' 3\" (1.6 m)   Wt 74.4 kg (164 lb)   SpO2 98%   BMI 29.05 kg/m²     Current Shift:  No intake/output data recorded.   Last three shifts:  06/26 1901 - 06/28 0700  In: 481.3   Out: -     Exam:    General: Well developed, alert, NAD, OX3  Head/Neck: NCAT, supple, No masses, No lymphadenopathy  CVS:Regular rate and rhythm, no M/R/G, S1/S2 heard, no thrill  Lungs:Clear to auscultation bilaterally, no wheezes, rhonchi, or rales  Abdomen: Soft, Nontender, No distention, Normal Bowel sounds, No hepatomegaly  Extremities: No C/C/E, pulses palpable 2+  Skin:normal texture and turgor, no rashes, no lesions  Neuro:grossly normal , follows commands  Psych:appropriate                Labs: Results:       Chemistry Recent Labs 06/28/22 0155 06/27/22 1910   GLU 91 104*    138   K 3.9 3.9    106   CO2 25 30   BUN 16 18   CREA 0.84 1.14   CA 8.0* 8.7   AGAP 5 2*   BUCR 19 16    122*   TP 6.4 7.3   ALB 3.2* 3.7   GLOB 3.2 3.6   AGRAT 1.0 1.0      CBC w/Diff Recent Labs     06/28/22  0825 06/28/22  0740 06/28/22 0155 06/27/22 1910 06/27/22 1910   WBC  --   --  8.1  --  10.0   RBC  --   --  2.91*  --  2.93*   HGB 8.4* 8.0* 6.0*   < > 5.5*   HCT 28.3* 26.5* 20.4*   < > 20.3*   PLT  --   --  354  --  422*   GRANS  --   --   --   --  62   LYMPH  --   --   --   --  30   EOS  --   --   --   --  1    < > = values in this interval not displayed. Cardiac Enzymes No results for input(s): CPK, CKND1, JHON in the last 72 hours. No lab exists for component: CKRMB, TROIP   Coagulation Recent Labs     06/27/22 1910   PTP 13.0   INR 0.9   APTT 27.2       Lipid Panel No results found for: CHOL, CHOLPOCT, CHOLX, CHLST, CHOLV, 351848, HDL, HDLP, LDL, LDLC, DLDLP, 345816, VLDLC, VLDL, TGLX, TRIGL, TRIGP, TGLPOCT, CHHD, CHHDX   BNP No results for input(s): BNPP in the last 72 hours.    Liver Enzymes Recent Labs     06/28/22 0155   TP 6.4   ALB 3.2*         Thyroid Studies No results found for: T4, T3U, TSH, TSHEXT     Procedures/imaging: see electronic medical records for all procedures/Xrays and details which were not copied into this note but were reviewed prior to creation of Norma Wall MD

## 2022-06-28 NOTE — PROGRESS NOTES
Problem: Falls - Risk of  Goal: *Absence of Falls  Description: Document Earma Vielka Fall Risk and appropriate interventions in the flowsheet.   Outcome: Progressing Towards Goal  Note: Fall Risk Interventions:                                Problem: Patient Education: Go to Patient Education Activity  Goal: Patient/Family Education  Outcome: Progressing Towards Goal

## 2022-06-28 NOTE — H&P
History & Physical    Patient: Lalito Jain MRN: [id number]  CSN: [id number]    YOB: 1955  Age: 69 y.o. Sex: female      DOA: 6/27/2022  Primary Care Provider:  Jaya, MD Hellen      Assessment/Plan     Patient Active Problem List   Diagnosis Code    Symptomatic anemia D64.9    Melena K801     42-year-old healthy female is admitted with symptomatic anemia and melena. Symptomatic anemia with melena and hiatal hernia  -Transfuse for hemoglobin less than 7  -Clear liquid diet  - GI consult for EGD  -Follow-up H. pylori testing  - Protonix 40 mg IV twice daily  --Will need PPI for discharge    Full code    Prophylaxis: SCDs, protonix IV, no anticoagulation due to bleeding    Estimated length of stay : 2 nights    MD Rickie Cruz  ----------------------------------------------------------------------------------------------------------------------------------------------------------------------------------------------------------------  CC: Abnormal blood work, dark stools       HPI:     Lalito Jain is a 69 y.o. female who is otherwise healthy presents to the ED at the request of her PCP for an abnormal hemoglobin of 5.5 drawn today. Her son is at bedside and states that she has had shortness of breath with exertion and lightheadedness over the past month. She says that she started having dark tarry stools about 3 days ago. She likes to drink Dr. Gustavo Kelly and eat tomatoes. She does not drink alcohol. She has not taken any aspirin or NSAIDs over-the-counter. She does have a hiatal hernia but does not take any antacid. She is only had heartburn symptoms over the past few days. She had a colonoscopy done in the past which was normal.  Her mother has a history of ulcers from unknown cause. She denies any fever, nausea, vomiting, or leg swelling. Past Medical History:   Diagnosis Date    Anxiety        History reviewed.  No pertinent surgical history. Family History   Problem Relation Age of Onset    Pancreatic Cancer Brother     Breast Cancer Mother    Mother with history of ulcers    Social History     Socioeconomic History    Marital status:    Tobacco Use    Smoking status: Never Smoker       Prior to Admission medications    Medication Sig Start Date End Date Taking? Authorizing Provider   sertraline (Zoloft) 100 mg tablet Take 100 mg by mouth daily. Yes Other, MD Hellen   escitalopram oxalate (LEXAPRO) 10 mg tablet Take 10 mg by mouth daily. Patient not taking: Reported on 2022    Other, MD Hellen       Allergies   Allergen Reactions    Sulfa (Sulfonamide Antibiotics) Rash       Review of Systems  Gen: No fever, chills, +malaise, no weight loss/gain. Heent: No headache, rhinorrhea, sore throat. Heart: No chest pain, palpitations, +CLAYTON, no pnd, or orthopnea. Resp: No cough, hemoptysis, wheezing and shortness of breath. GI: No nausea, vomiting, diarrhea, constipation, +melena. : No urinary obstruction, dysuria or hematuria. Derm: No rash, new skin lesion or pruritis. Musc/skeletal: no bone or joint complaints. Vasc: No edema, cyanosis or claudication. Endo: No heat/cold intolerance, no polyuria,polydipsia or polyphagia. Neuro: No unilateral weakness, numbness, tingling. No seizures. Heme: No easy bruising or bleeding. Physical Exam:     Physical Exam:  Visit Vitals  BP (!) 166/65 (BP 1 Location: Right upper arm, BP Patient Position: At rest;Lying)   Pulse 79   Temp 98.8 °F (37.1 °C)   Resp 18   Ht 5' 3\" (1.6 m)   Wt 74.4 kg (164 lb)   SpO2 99%   BMI 29.05 kg/m²           Temp (24hrs), Av °F (37.2 °C), Min:98.8 °F (37.1 °C), Max:99.4 °F (37.4 °C)     1901 -  0700  In: 156.3   Out: -    No intake/output data recorded. General:  Awake, cooperative, no distress. Head:  Normocephalic, without obvious abnormality, atraumatic. Eyes:  Conjunctivae/corneas clear, sclera anicteric. Neck: Supple, symmetrical, trachea midline. Lungs:   Clear to auscultation bilaterally. Heart:  Regular rate and rhythm, S1, S2 normal, no murmur, click, rub or gallop. Abdomen: Soft, non-tender. Bowel sounds normal. No masses,  No organomegaly. Extremities: Extremities normal, atraumatic, no cyanosis or edema. Capillary refill normal.   Pulses: 2+ and symmetric all extremities. Skin: Skin color pale, turgor normal. No rashes or lesions     Labs Reviewed: All lab results for the last 24 hours reviewed. Recent Results (from the past 24 hour(s))   CBC WITH AUTOMATED DIFF    Collection Time: 06/27/22  7:10 PM   Result Value Ref Range    WBC 10.0 4.6 - 13.2 K/uL    RBC 2.93 (L) 4.20 - 5.30 M/uL    HGB 5.5 (LL) 12.0 - 16.0 g/dL    HCT 20.3 (L) 35.0 - 45.0 %    MCV 69.3 (L) 78.0 - 100.0 FL    MCH 18.8 (L) 24.0 - 34.0 PG    MCHC 27.1 (L) 31.0 - 37.0 g/dL    RDW 18.1 (H) 11.6 - 14.5 %    PLATELET 068 (H) 202 - 420 K/uL    MPV 8.6 (L) 9.2 - 11.8 FL    NRBC 0.0 0  WBC    ABSOLUTE NRBC 0.00 0.00 - 0.01 K/uL    NEUTROPHILS 62 40 - 73 %    LYMPHOCYTES 30 21 - 52 %    MONOCYTES 5 3 - 10 %    EOSINOPHILS 1 0 - 5 %    BASOPHILS 1 0 - 2 %    IMMATURE GRANULOCYTES 1 (H) 0.0 - 0.5 %    ABS. NEUTROPHILS 6.2 1.8 - 8.0 K/UL    ABS. LYMPHOCYTES 3.0 0.9 - 3.6 K/UL    ABS. MONOCYTES 0.5 0.05 - 1.2 K/UL    ABS. EOSINOPHILS 0.1 0.0 - 0.4 K/UL    ABS. BASOPHILS 0.1 0.0 - 0.1 K/UL    ABS. IMM.  GRANS. 0.1 (H) 0.00 - 0.04 K/UL    DF AUTOMATED      PLATELET COMMENTS Increased Platelets      RBC COMMENTS MICROCYTOSIS  1+        RBC COMMENTS HYPOCHROMIA  2+        RBC COMMENTS ANISOCYTOSIS  1+       TYPE & SCREEN    Collection Time: 06/27/22  7:10 PM   Result Value Ref Range    Crossmatch Expiration 06/30/2022,2359     ABO/Rh(D) A POSITIVE     Antibody screen NEG     Unit number [id number]     Blood component type RC LR,1     Unit division 00     Status of unit ALLOCATED     Crossmatch result Compatible     Unit number [id number]     Blood component type RC LR,1     Unit division 00     Status of unit ISSUED     Crossmatch result Compatible    PROTHROMBIN TIME + INR    Collection Time: 06/27/22  7:10 PM   Result Value Ref Range    Prothrombin time 13.0 11.5 - 15.2 sec    INR 0.9 0.8 - 1.2     PTT    Collection Time: 06/27/22  7:10 PM   Result Value Ref Range    aPTT 27.2 23.0 - 21.3 SEC   METABOLIC PANEL, COMPREHENSIVE    Collection Time: 06/27/22  7:10 PM   Result Value Ref Range    Sodium 138 136 - 145 mmol/L    Potassium 3.9 3.5 - 5.5 mmol/L    Chloride 106 100 - 111 mmol/L    CO2 30 21 - 32 mmol/L    Anion gap 2 (L) 3.0 - 18 mmol/L    Glucose 104 (H) 74 - 99 mg/dL    BUN 18 7.0 - 18 MG/DL    Creatinine 1.14 0.6 - 1.3 MG/DL    BUN/Creatinine ratio 16 12 - 20      GFR est AA 57 (L) >60 ml/min/1.73m2    GFR est non-AA 47 (L) >60 ml/min/1.73m2    Calcium 8.7 8.5 - 10.1 MG/DL    Bilirubin, total 0.2 0.2 - 1.0 MG/DL    ALT (SGPT) 26 13 - 56 U/L    AST (SGOT) 20 10 - 38 U/L    Alk. phosphatase 122 (H) 45 - 117 U/L    Protein, total 7.3 6.4 - 8.2 g/dL    Albumin 3.7 3.4 - 5.0 g/dL    Globulin 3.6 2.0 - 4.0 g/dL    A-G Ratio 1.0 0.8 - 1.7     OCCULT BLOOD, STOOL    Collection Time: 06/27/22  7:10 PM   Result Value Ref Range    Occult blood, stool Positive (A) NEG     TROPONIN-HIGH SENSITIVITY    Collection Time: 06/27/22  7:10 PM   Result Value Ref Range    Troponin-High Sensitivity 9 0 - 54 ng/L   RBC, ALLOCATE    Collection Time: 06/27/22  7:30 PM   Result Value Ref Range    HISTORY CHECKED?  Historical check performed    EKG, 12 LEAD, INITIAL    Collection Time: 06/27/22  7:37 PM   Result Value Ref Range    Ventricular Rate 85 BPM    Atrial Rate 85 BPM    P-R Interval 148 ms    QRS Duration 86 ms    Q-T Interval 380 ms    QTC Calculation (Bezet) 452 ms    Calculated P Axis 44 degrees    Calculated R Axis 10 degrees    Calculated T Axis 30 degrees    Diagnosis       Normal sinus rhythm  Normal ECG  No previous ECGs available COVID-19 RAPID TEST    Collection Time: 06/27/22  8:30 PM   Result Value Ref Range    Specimen source Nasopharyngeal      COVID-19 rapid test Not detected NOTD

## 2022-06-28 NOTE — ED NOTES
TRANSFER - OUT REPORT:    Verbal report given to Messi Garcia RN(name) on Akin Hammer  being transferred to [address] [place] [address](unit) for routine progression of care       Report consisted of patients Situation, Background, Assessment and   Recommendations(SBAR). Information from the following report(s) SBAR, Kardex, ED Summary, [facility] and Recent Results was reviewed with the receiving nurse. Lines:   Peripheral IV 06/27/22 Left Antecubital (Active)   Site Assessment Clean, dry, & intact 06/27/22 1916   Phlebitis Assessment 0 06/27/22 1916   Infiltration Assessment 0 06/27/22 1916   Dressing Status Clean, dry, & intact 06/27/22 1916   Dressing Type Transparent 06/27/22 1916   Hub Color/Line Status Flushed;Patent 06/27/22 1916   Action Taken Blood drawn 06/27/22 1916        Opportunity for questions and clarification was provided.       Patient transported with:   Registered Nurse

## 2022-06-28 NOTE — PROGRESS NOTES
Reason for Admission:   Anemia [D64.9]    PCP: Gallo White (MAINE Sanchez)     There are currently no Active Isolations  No active infections                RUR Score:     Low; 7%             Resources/supports,as identified by patient/family:         Barriers to discharge:        Plan for utilizing home health:    no                          Likelihood of readmission:            Transition of Care Plan:    Home with physician follow up               CM rounded with provider and pt's bedside nurse. Pt is  and independent. Pt has access to a RW if needed. Pt has confirmed her PCP in the community. Anticipate pt will transition home with in the next 24-48 hours with physician follow up. Pt's  will transport pt home when medically stable. No other care transition needs have been identified at this time. CM to continue to follow and assist as needed. Initial assessment completed with patient. Cognitive status of patient: oriented to time, place, person and situation. Face sheet information confirmed:  yes. The patient designates her spouse to participate in her discharge plan and to receive any needed information. This patient lives in a single family home with patient and spouse. Patient is able to navigate steps as needed. Prior to hospitalization, patient was considered to be independent with ADLs/IADLS : yes . The patient and spouse will be available to transport patient home upon discharge. The patient already has a RW available in the home. Patient is not currently active with home health. This patient is on dialysis/days :no    Currently, the discharge plan is Home. The patient states that she can obtain her medications from the pharmacy, and take her medications as directed. Patient's current insurance is Payor: [place] [employer] / Plan: [place] MED[place]ARE PART A & B / Product Type: Medicare /        Care Management Interventions  Mode of Transport at Discharge:  Other (see comment) (Family )  Transition of Care Consult (CM Consult): Discharge Planning  Health Maintenance Reviewed: Yes  Support Systems: Spouse/Significant Other  Confirm Follow Up Transport: Self  The Plan for Transition of Care is Related to the Following Treatment Goals : Home with physician follow up  Discharge Location  Patient Expects to be Discharged to[de-identified] Home with family assistance

## 2022-06-29 VITALS
RESPIRATION RATE: 16 BRPM | DIASTOLIC BLOOD PRESSURE: 54 MMHG | HEART RATE: 67 BPM | SYSTOLIC BLOOD PRESSURE: 129 MMHG | TEMPERATURE: 98.8 F | BODY MASS INDEX: 30.03 KG/M2 | HEIGHT: 63 IN | WEIGHT: 169.5 LBS | OXYGEN SATURATION: 99 %

## 2022-06-29 PROBLEM — K25.7 CHRONIC CAMERON ULCER: Status: ACTIVE | Noted: 2022-06-29

## 2022-06-29 LAB
ALBUMIN SERPL-MCNC: 3.1 G/DL (ref 3.4–5)
ALBUMIN/GLOB SERPL: 1 {RATIO} (ref 0.8–1.7)
ALP SERPL-CCNC: 108 U/L (ref 45–117)
ALT SERPL-CCNC: 23 U/L (ref 13–56)
ANION GAP SERPL CALC-SCNC: 4 MMOL/L (ref 3–18)
AST SERPL-CCNC: 19 U/L (ref 10–38)
ATRIAL RATE: 85 BPM
BILIRUB SERPL-MCNC: 0.5 MG/DL (ref 0.2–1)
BUN SERPL-MCNC: 12 MG/DL (ref 7–18)
BUN/CREAT SERPL: 14 (ref 12–20)
CALCIUM SERPL-MCNC: 8.5 MG/DL (ref 8.5–10.1)
CALCULATED P AXIS, ECG09: 44 DEGREES
CALCULATED R AXIS, ECG10: 10 DEGREES
CALCULATED T AXIS, ECG11: 30 DEGREES
CHLORIDE SERPL-SCNC: 110 MMOL/L (ref 100–111)
CO2 SERPL-SCNC: 27 MMOL/L (ref 21–32)
CREAT SERPL-MCNC: 0.88 MG/DL (ref 0.6–1.3)
DIAGNOSIS, 93000: NORMAL
ERYTHROCYTE [DISTWIDTH] IN BLOOD BY AUTOMATED COUNT: 19.4 % (ref 11.6–14.5)
GLOBULIN SER CALC-MCNC: 3.2 G/DL (ref 2–4)
GLUCOSE SERPL-MCNC: 83 MG/DL (ref 74–99)
HCT VFR BLD AUTO: 28.1 % (ref 35–45)
HGB BLD-MCNC: 8.2 G/DL (ref 12–16)
MCH RBC QN AUTO: 21.6 PG (ref 24–34)
MCHC RBC AUTO-ENTMCNC: 29.2 G/DL (ref 31–37)
MCV RBC AUTO: 73.9 FL (ref 78–100)
NRBC # BLD: 0 K/UL (ref 0–0.01)
NRBC BLD-RTO: 0 PER 100 WBC
P-R INTERVAL, ECG05: 148 MS
PLATELET # BLD AUTO: 344 K/UL (ref 135–420)
PMV BLD AUTO: 8.9 FL (ref 9.2–11.8)
POTASSIUM SERPL-SCNC: 4.1 MMOL/L (ref 3.5–5.5)
PROT SERPL-MCNC: 6.3 G/DL (ref 6.4–8.2)
Q-T INTERVAL, ECG07: 380 MS
QRS DURATION, ECG06: 86 MS
QTC CALCULATION (BEZET), ECG08: 452 MS
RBC # BLD AUTO: 3.8 M/UL (ref 4.2–5.3)
SODIUM SERPL-SCNC: 141 MMOL/L (ref 136–145)
VENTRICULAR RATE, ECG03: 85 BPM
WBC # BLD AUTO: 7.4 K/UL (ref 4.6–13.2)

## 2022-06-29 PROCEDURE — 74011000250 HC RX REV CODE- 250: Performed by: FAMILY MEDICINE

## 2022-06-29 PROCEDURE — 74011250637 HC RX REV CODE- 250/637: Performed by: FAMILY MEDICINE

## 2022-06-29 PROCEDURE — 36415 COLL VENOUS BLD VENIPUNCTURE: CPT

## 2022-06-29 PROCEDURE — 74011250636 HC RX REV CODE- 250/636: Performed by: FAMILY MEDICINE

## 2022-06-29 PROCEDURE — 85027 COMPLETE CBC AUTOMATED: CPT

## 2022-06-29 PROCEDURE — C9113 INJ PANTOPRAZOLE SODIUM, VIA: HCPCS | Performed by: FAMILY MEDICINE

## 2022-06-29 PROCEDURE — 80053 COMPREHEN METABOLIC PANEL: CPT

## 2022-06-29 RX ORDER — PANTOPRAZOLE SODIUM 40 MG/1
40 TABLET, DELAYED RELEASE ORAL 2 TIMES DAILY
Qty: 60 TABLET | Refills: 1 | Status: SHIPPED | OUTPATIENT
Start: 2022-06-29

## 2022-06-29 RX ADMIN — SODIUM CHLORIDE, PRESERVATIVE FREE 10 ML: 5 INJECTION INTRAVENOUS at 05:03

## 2022-06-29 RX ADMIN — PANTOPRAZOLE SODIUM 40 MG: 40 INJECTION, POWDER, FOR SOLUTION INTRAVENOUS at 09:09

## 2022-06-29 RX ADMIN — SERTRALINE 100 MG: 50 TABLET, FILM COATED ORAL at 09:10

## 2022-06-29 NOTE — PROGRESS NOTES
Hospitalist Progress Note    Patient: Mary Ann Crespo MRN: [id number]  CSN: [id number]    YOB: 1955  Age: 69 y.o. Sex: female    DOA: 6/27/2022 LOS:  LOS: 2 days          Chief Complaint:    symptomatic anemia and melena. Assessment/Plan     58-year-old healthy female is admitted with symptomatic anemia and melena.     Symptomatic anemia with melena and hiatal hernia -  Hgb now stable   S/p EGD, ulcers found, biopsies taken, large hiatal hernia     D/C today: antireflux diet, Pantoprazole 40 mg twice daily for 2 months and then 40 mg daily for life. Consider getting surgical repair of the hiatal hernia. Repeat EGD in 3 months. H. Pylori results pending, will need treatment if positive    Disposition : DC today  Patient Active Problem List   Diagnosis Code    Symptomatic anemia D64.9    Melena K92.1    Hiatal hernia K44.9    Chronic Javi ulcer K25.7       Subjective:    Ready to go home  No complaints    Review of systems:    Constitutional: denies fevers, chills, myalgias  Respiratory: denies SOB, cough  Cardiovascular: denies chest pain, palpitations  Gastrointestinal: denies nausea, vomiting, diarrhea      Vital signs/Intake and Output:  Visit Vitals  BP (!) 134/51   Pulse 63   Temp 97.8 °F (36.6 °C)   Resp 16   Ht 5' 3\" (1.6 m)   Wt 76.9 kg (169 lb 8 oz)   SpO2 99%   BMI 30.03 kg/m²     Current Shift:  No intake/output data recorded.   Last three shifts:  06/27 1901 - 06/29 0700  In: 721.3 [P.O.:240]  Out: 900 [Urine:900]    Exam:    General: Well developed, alert, NAD, OX3  Head/Neck: NCAT, supple, No masses, No lymphadenopathy  CVS:Regular rate and rhythm, no M/R/G, S1/S2 heard, no thrill  Lungs:Clear to auscultation bilaterally, no wheezes, rhonchi, or rales  Abdomen: Soft, Nontender, No distention, Normal Bowel sounds, No hepatomegaly  Extremities: No C/C/E, pulses palpable 2+  Skin:normal texture and turgor, no rashes, no lesions  Neuro:grossly normal , follows commands  Psych:appropriate                Labs: Results:       Chemistry Recent Labs     06/29/22 0315 06/28/22 0155 06/27/22 1910   GLU 83 91 104*    139 138   K 4.1 3.9 3.9    109 106   CO2 27 25 30   BUN 12 16 18   CREA 0.88 0.84 1.14   CA 8.5 8.0* 8.7   AGAP 4 5 2*   BUCR 14 19 16    104 122*   TP 6.3* 6.4 7.3   ALB 3.1* 3.2* 3.7   GLOB 3.2 3.2 3.6   AGRAT 1.0 1.0 1.0      CBC w/Diff Recent Labs     06/29/22 0315 06/28/22  0825 06/28/22  0740 06/28/22 0155 06/28/22 0155 06/27/22 1910 06/27/22 1910   WBC 7.4  --   --   --  8.1  --  10.0   RBC 3.80*  --   --   --  2.91*  --  2.93*   HGB 8.2* 8.4* 8.0*   < > 6.0*   < > 5.5*   HCT 28.1* 28.3* 26.5*   < > 20.4*   < > 20.3*     --   --   --  354  --  422*   GRANS  --   --   --   --   --   --  62   LYMPH  --   --   --   --   --   --  30   EOS  --   --   --   --   --   --  1    < > = values in this interval not displayed. Cardiac Enzymes No results for input(s): CPK, CKND1, JHON in the last 72 hours. No lab exists for component: CKRMB, TROIP   Coagulation Recent Labs     06/27/22 1910   PTP 13.0   INR 0.9   APTT 27.2       Lipid Panel No results found for: CHOL, CHOLPOCT, CHOLX, CHLST, CHOLV, 793702, HDL, HDLP, LDL, LDLC, DLDLP, 916604, VLDLC, VLDL, TGLX, TRIGL, TRIGP, TGLPOCT, CHHD, CHHDX   BNP No results for input(s): BNPP in the last 72 hours.    Liver Enzymes Recent Labs     06/29/22  0315   TP 6.3*   ALB 3.1*         Thyroid Studies No results found for: T4, T3U, TSH, TSHEXT, TSHEXT     Procedures/imaging: see electronic medical records for all procedures/Xrays and details which were not copied into this note but were reviewed prior to creation of Vidya Henry MD

## 2022-06-29 NOTE — PROGRESS NOTES
Patient received discharge instructions. All questions and concerns were address. ID bracelets removed and shredded.

## 2022-06-29 NOTE — PROGRESS NOTES
Problem: Falls - Risk of  Goal: *Absence of Falls  Description: Document Clydene Bone Fall Risk and appropriate interventions in the flowsheet.   Outcome: Progressing Towards Goal  Note: Fall Risk Interventions:            Medication Interventions: Teach patient to arise slowly                   Problem: Patient Education: Go to Patient Education Activity  Goal: Patient/Family Education  Outcome: Progressing Towards Goal     Problem: General Medical Care Plan  Goal: *Vital signs within specified parameters  Outcome: Progressing Towards Goal

## 2022-06-29 NOTE — DISCHARGE INSTRUCTIONS
Patient Education        Learning About Blood Transfusions  What is a blood transfusion? Blood transfusion is a medical treatment to replace the blood or parts of blood that your body has lost. The blood goes through a tube from a bag to an intravenous (IV) catheter and into your vein. You may need a blood transfusion after losing blood from an injury, a major surgery, an illness that causes bleeding, or an illness that destroys blood cells. Transfusions are also used to give you the parts of blood--such as platelets, plasma, or substances that cause clotting--that your body needs to fight an illness or stop bleeding. How is a blood transfusion done? Before you receive a blood transfusion, your blood is tested to find out what your blood type is. Blood or blood parts that are a match with your blood type are ordered by your doctor. Blood is typed as A, B, AB, or O. It is also typed as Rh-positive or Rh-negative. Your blood is also screened to look for antibodies that might react with the blood that is given to you. The blood you are getting is checked and rechecked to make sure that it's the right type for you. A sample of your blood is mixed with a sample of the blood you will receive to check for problems. Before actually giving you the transfusion, a doctor and nurses will look at the label on the package of blood and compare it to your hospital ID bracelet and medical records. The transfusion begins only when all agree that this is the correct blood and that you are the correct person to receive it. To receive the transfusion, you will have an intravenous (IV) catheter inserted into a vein. A tube connects the catheter to the bag containing the blood, which is placed higher than your body. The blood then flows slowly into your vein. A doctor or nurse will check you several times during the transfusion to watch for a reaction or other problems. What are the possible risks?   Blood transfusions have many benefits and are often life-saving. But they also have a few risks. Possible risks include:  · Your body's reaction to receiving new blood. This may include:  ? Fever. ? Breathing problems. ? Allergic reaction, such as hives, swelling, or a new rash. · An infection from the blood. This risk is small because of the strict rules placed on handling and storing blood. Getting a viral infection, such as HIV or hepatitis B or C, through blood transfusions has become very rare. The U[place]. Food and Drug Administration (FDA) enforces strict guidelines on the collection, testing, storage, and use of blood. · Getting the wrong blood type by accident. Severe reactions, which can be life-threatening, are very rare. · An infection at the transfusion site, such as redness, swelling, pain, bleeding, or pus. How can you care for yourself at home? To prevent infection at the transfusion site  · Wash the area daily with warm, soapy water, and pat it dry. Don't use hydrogen peroxide or alcohol, which can slow healing. You may cover the area with a gauze bandage if it weeps or rubs against clothing. Change the bandage every day. · Keep the area clean and dry. When should you call for help? Call 911 anytime you think you may need emergency care. For example, call if:  · You have severe trouble breathing. Call your doctor now or seek immediate medical care if:  · You have signs of an allergic reaction, such as hives, swelling, or a new rash. · You have a fever. · You feel weaker or more tired than usual.  · You have a yellow tint to your skin or the whites of your eyes. · You have signs of an infection at the transfusion site, such as redness, swelling, pain, bleeding, or pus. Watch closely for changes in your health, and be sure to contact your doctor if you have any problems. Follow-up care is a key part of your treatment and safety.  Be sure to make and go to all appointments, and call your doctor if you are having problems. It's also a good idea to know your test results and keep a list of the medicines you take. Where can you learn more? Go to http://www.gray.com/  Enter V588 in the search box to learn more about \"Learning About Blood Transfusions. \"  Current as of: November 29, 2021               Content Version: 13.2  © 2006-2022 [employer]. Care instructions adapted under license by [employer] (which disclaims liability or warranty for this information). If you have questions about a medical condition or this instruction, always ask your healthcare professional. [place] [address] any warranty or liability for your use of this information. Patient Education        Anemia: Care Instructions  Your Care Instructions     Anemia is a low level of red blood cells, which carry oxygen throughout your body. Many things can cause anemia. Lack of iron is one of the most common causes. Your body needs iron to make hemoglobin, a substance in red blood cells that carries oxygen from the lungs to your body's cells. Without enough iron, the body produces fewer and smaller red blood cells. As a result, your body's cells do not get enough oxygen, and you feel tired and weak. And you may have trouble concentrating. Bleeding is the most common cause of a lack of iron. You may have heavy menstrual bleeding or bleeding caused by conditions such as ulcers, hemorrhoids, or cancer. Regular use of aspirin or other anti-inflammatory medicines (such as ibuprofen) also can cause bleeding in some people. A lack of iron in your diet also can cause anemia, especially at times when the body needs more iron, such as during pregnancy, infancy, and the teen years. Your doctor may have prescribed iron pills.  It may take several months of treatment for your iron levels to return to normal. Your doctor also may suggest that you eat foods that are rich in iron, such as meat and beans.  There are many other causes of anemia. It is not always due to a lack of iron. Finding the specific cause of your anemia will help your doctor find the right treatment for you. Follow-up care is a key part of your treatment and safety. Be sure to make and go to all appointments, and call your doctor if you are having problems. It's also a good idea to know your test results and keep a list of the medicines you take. How can you care for yourself at home? · Take your medicines exactly as prescribed. Call your doctor if you think you are having a problem with your medicine. · If your doctor recommends iron pills, take them as directed:  ? Try to take the pills on an empty stomach about 1 hour before or 2 hours after meals. But you may need to take iron with food to avoid an upset stomach. ? Do not take antacids or drink milk or caffeine drinks (such as coffee, tea, or cola) at the same time or within 2 hours of the time that you take your iron. They can make it hard for your body to absorb the iron. ? Vitamin C (from food or supplements) helps your body absorb iron. Try taking iron pills with a glass of orange juice or some other food that is high in vitamin C, such as citrus fruits. ? Iron pills may cause stomach problems, such as heartburn, nausea, diarrhea, constipation, and cramps. Be sure to drink plenty of fluids, and include fruits, vegetables, and fiber in your diet each day. Iron pills often make your bowel movements dark or green. ? If you forget to take an iron pill, do not take a double dose of iron the next time you take a pill. ? Keep iron pills out of the reach of small children. An overdose of iron can be very dangerous. · Follow your doctor's advice about eating iron-rich foods. These include red meat, shellfish, poultry, eggs, beans, raisins, whole-grain bread, and leafy green vegetables. · Steam vegetables to help them keep their iron content. When should you call for help? Call 911 anytime you think you may need emergency care. For example, call if:    · You have symptoms of a heart attack. These may include:  ? Chest pain or pressure, or a strange feeling in the chest.  ? Sweating. ? Shortness of breath. ? Nausea or vomiting. ? Pain, pressure, or a strange feeling in the back, neck, jaw, or upper belly or in one or both shoulders or arms. ? Lightheadedness or sudden weakness. ? A fast or irregular heartbeat. After you call 911, the  may tell you to chew 1 adult-strength or 2 to 4 low-dose aspirin. Wait for an ambulance. Do not try to drive yourself.     · You passed out (lost consciousness). Call your doctor now or seek immediate medical care if:    · You have new or increased shortness of breath.     · You are dizzy or lightheaded, or you feel like you may faint.     · Your fatigue and weakness continue or get worse.     · You have any abnormal bleeding, such as:  ? Nosebleeds. ? Vaginal bleeding that is different (heavier, more frequent, at a different time of the month) than what you are used to.  ? Bloody or black stools, or rectal bleeding. ? Bloody or pink urine. Watch closely for changes in your health, and be sure to contact your doctor if:    · You do not get better as expected. Where can you learn more? Go to http://www.dyer.com/  Enter R301 in the search box to learn more about \"Anemia: Care Instructions. \"  Current as of: November 29, 2021               Content Version: 13.2  © 0547-4676 [employer]. Care instructions adapted under license by [employer] (which disclaims liability or warranty for this information). If you have questions about a medical condition or this instruction, always ask your healthcare professional. [address] any warranty or liability for your use of this information.

## 2022-06-29 NOTE — PROGRESS NOTES
Assumed patient care. Patient is alert and oriented. Resting on bed. Bed at low position, wheels locked and call light within reach.

## 2022-06-29 NOTE — PROGRESS NOTES
Verbal shift change report given to Gregory De León RN (oncoming nurse) by Caprice Lopez RN (offgoing nurse). Report included the following information SBAR, Kardex, Intake/Output, MAR and Recent Results.

## 2022-06-29 NOTE — DISCHARGE SUMMARY
Discharge Summary    Patient: Lalito Jain MRN: [id number]  CSN: [id number]    YOB: 1955  Age: 69 y.o. Sex: female    DOA: 6/27/2022 LOS:  LOS: 2 days   Discharge Date:      Primary Care Provider:  Other, MD Hellen    Admission Diagnoses: Anemia [D64.9]    Discharge Diagnoses:    Problem List as of 6/29/2022 Date Reviewed: 6/28/2022          Codes Class Noted - Resolved    Chronic Diego Broad ulcer ICD-10-CM: K25.7  ICD-9-CM: 531.70  6/29/2022 - Present        Melena ICD-10-CM: K92.1  ICD-9-CM: 578.1  6/28/2022 - Present        Hiatal hernia ICD-10-CM: K44.9  ICD-9-CM: 553.3  6/28/2022 - Present        * (Principal) Symptomatic anemia ICD-10-CM: D64.9  ICD-9-CM: 285.9  6/27/2022 - Present              Discharge Medications:     Current Discharge Medication List      START taking these medications    Details   pantoprazole (PROTONIX) 40 mg tablet Take 1 Tablet by mouth two (2) times a day. Qty: 60 Tablet, Refills: 1  Start date: 6/29/2022         CONTINUE these medications which have NOT CHANGED    Details   sertraline (Zoloft) 100 mg tablet Take 100 mg by mouth daily. STOP taking these medications       escitalopram oxalate (LEXAPRO) 10 mg tablet Comments:   Reason for Stopping:               Discharge Condition: improved     Procedures : s/p EGD    Consults: Gastroenterology, Dr. Usama Polk  BP (!) 129/54 (BP 1 Location: Right upper arm, BP Patient Position: At rest)   Pulse 67   Temp 98.8 °F (37.1 °C)   Resp 16   Ht 5' 3\" (1.6 m)   Wt 76.9 kg (169 lb 8 oz)   SpO2 99%   BMI 30.03 kg/m²     General: Awake, cooperative, no acute distress    HEENT: NC, Atraumatic. PERRLA, EOMI. Anicteric sclerae. Lungs:  CTA Bilaterally. No Wheezing/Rhonchi/Rales. Heart:  Regular  rhythm,  No murmur, No Rubs, No Gallops  Abdomen: Soft, Non distended, Non tender. +Bowel sounds,   Extremities: No c/c/e  Psych:   Not anxious or agitated.   Neurologic:  No acute neurological deficits. Admission HPI : Landy Sloan is a 69 y.o. female who is otherwise healthy presents to the ED at the request of her PCP for an abnormal hemoglobin of 5.5 drawn today. Her son is at bedside and states that she has had shortness of breath with exertion and lightheadedness over the past month. She says that she started having dark tarry stools about 3 days ago. She likes to drink Dr. Smith Raw and eat tomatoes. She does not drink alcohol. She has not taken any aspirin or NSAIDs over-the-counter. She does have a hiatal hernia but does not take any antacid. She is only had heartburn symptoms over the past few days. She had a colonoscopy done in the past which was normal.  Her mother has a history of ulcers from unknown cause. She denies any fever, nausea, vomiting, or leg swelling. Hospital Course :     40-year-old healthy female is admitted with symptomatic anemia and melena.     Symptomatic anemia with melena and hiatal hernia -  Hgb now stable   S/p EGD, ulcers found, biopsies taken, large hiatal hernia      D/C today: antireflux diet, Pantoprazole 40 mg twice daily for 2 months and then 40 mg daily for life. Consider getting surgical repair of the hiatal hernia. Repeat EGD in 3 months.   H. Pylori results pending, will need treatment if positive    Activity: as tolerated     Diet: antireflux diet     Follow-up: in 3 months for EGD with Dr. Julio César Allen,   3-5 days with PCP    Disposition: home     Minutes spent on discharge: 35 minutes       Labs: Results:       Chemistry Recent Labs     06/29/22 0315 06/28/22  0155 06/27/22  1910   GLU 83 91 104*    139 138   K 4.1 3.9 3.9    109 106   CO2 27 25 30   BUN 12 16 18   CREA 0.88 0.84 1.14   CA 8.5 8.0* 8.7   AGAP 4 5 2*   BUCR 14 19 16    104 122*   TP 6.3* 6.4 7.3   ALB 3.1* 3.2* 3.7   GLOB 3.2 3.2 3.6   AGRAT 1.0 1.0 1.0      CBC w/Diff Recent Labs     06/29/22 0315 06/28/22  0825 06/28/22  0740 06/28/22  0155 06/28/22  0155 06/27/22 1910 06/27/22 1910   WBC 7.4  --   --   --  8.1  --  10.0   RBC 3.80*  --   --   --  2.91*  --  2.93*   HGB 8.2* 8.4* 8.0*   < > 6.0*   < > 5.5*   HCT 28.1* 28.3* 26.5*   < > 20.4*   < > 20.3*     --   --   --  354  --  422*   GRANS  --   --   --   --   --   --  62   LYMPH  --   --   --   --   --   --  30   EOS  --   --   --   --   --   --  1    < > = values in this interval not displayed. Cardiac Enzymes No results for input(s): CPK, CKND1, JHON in the last 72 hours. No lab exists for component: CKRMB, TROIP   Coagulation Recent Labs     06/27/22 1910   PTP 13.0   INR 0.9   APTT 27.2       Lipid Panel No results found for: CHOL, CHOLPOCT, CHOLX, CHLST, CHOLV, 821525, HDL, HDLP, LDL, LDLC, DLDLP, 669222, VLDLC, VLDL, TGLX, TRIGL, TRIGP, TGLPOCT, CHHD, CHHDX   BNP No results for input(s): BNPP in the last 72 hours. Liver Enzymes Recent Labs     06/29/22  0315   TP 6.3*   ALB 3.1*         Thyroid Studies No results found for: T4, T3U, TSH, TSHEXT, TSHEXT         Significant Diagnostic Studies: No results found. No results found for this or any previous visit.         CC: Other, MD Hellen

## 2022-06-30 LAB
H PYLORI IGA SER-ACNC: <9 UNITS (ref 0–8.9)
H PYLORI IGG SER IA-ACNC: 0.13 INDEX VALUE (ref 0–0.79)
H PYLORI IGM SER-ACNC: <9 UNITS (ref 0–8.9)

## 2022-07-01 LAB
ABO + RH BLD: NORMAL
BLD PROD TYP BPU: NORMAL
BLOOD GROUP ANTIBODIES SERPL: NORMAL
BPU ID: NORMAL
CALLED TO:,BCALL1: NORMAL
CROSSMATCH RESULT,%XM: NORMAL
SPECIMEN EXP DATE BLD: NORMAL
STATUS OF UNIT,%ST: NORMAL
UNIT DIVISION, %UDIV: 0

## 2022-07-07 NOTE — PROGRESS NOTES
Physician Progress Note      PATIENT:               Kian Morales  CSN #:                  [id number]  :                       1955  ADMIT DATE:       2022 6:57 PM  [address] DATE:        2022 12:49 PM  RESPONDING  PROVIDER #:        Kelly Morgan MD          QUERY TEXT:    Patient admitted with Anemia  noted to have  melena. Per EGD patient to have an  Ulcer in gastric body. If possible, please document in progress notes and discharge summary the cause of the Melena:       The medical record reflects the following:  Risk Factors: Melena  Clinical Indicators: Positive occult blood ,stool , Per EGD patient to have an  Ulcer in gastric body  H. Pylori results negative  Treatment: 1 unit of PRBC, EGD, Pantoprazole BID    Thank You  Geoffrey Elam RN, CDI, CRCR  Options provided:  -- Melena due to Gastric ulcer  -- Melena due to ## please specify, please specify  -- Other - I will add my own diagnosis  -- Disagree - Not applicable / Not valid  -- Disagree - Clinically unable to determine / Unknown  -- Refer to Clinical Documentation Reviewer    PROVIDER RESPONSE TEXT:    This patient has melena due to Gastric ulcer    Query created by: Alicia Tim on 2022 7:43 AM      Electronically signed by:  Kelly Morgan MD 2022 8:01 PM

## (undated) DEVICE — KENDALL RADIOLUCENT FOAM MONITORING ELECTRODE RECTANGULAR SHAPE: Brand: KENDALL

## (undated) DEVICE — Device

## (undated) DEVICE — REM POLYHESIVE ADULT PATIENT RETURN ELECTRODE: Brand: VALLEYLAB

## (undated) DEVICE — SPONGE GZ W4XL4IN COT 12 PLY TYP VII WVN C FLD DSGN

## (undated) DEVICE — MOUTHPIECE ENDOSCP 20X27MM --

## (undated) DEVICE — TUBING, SUCTION, 1/4" X 12', STRAIGHT: Brand: MEDLINE

## (undated) DEVICE — GARMENT,MEDLINE,DVT,INT,CALF,MED, GEN2: Brand: MEDLINE

## (undated) DEVICE — SINGLE PORT MANIFOLD: Brand: NEPTUNE 2

## (undated) DEVICE — MAJ-1414 SINGLE USE ADPATER BIOPSY VALV: Brand: SINGLE USE ADAPTOR BIOPSY VALVE

## (undated) DEVICE — TRAP SPEC COLL POLYP POLYSTYR --

## (undated) DEVICE — FORCEPS BX CAP 240CM L RAD JAW 4